# Patient Record
Sex: MALE | Race: WHITE | NOT HISPANIC OR LATINO | Employment: UNEMPLOYED | URBAN - METROPOLITAN AREA
[De-identification: names, ages, dates, MRNs, and addresses within clinical notes are randomized per-mention and may not be internally consistent; named-entity substitution may affect disease eponyms.]

---

## 2020-02-28 ENCOUNTER — HOSPITAL ENCOUNTER (EMERGENCY)
Age: 70
Discharge: HOME | End: 2020-02-29
Attending: EMERGENCY MEDICINE

## 2020-02-28 ENCOUNTER — APPOINTMENT (OUTPATIENT)
Dept: CT IMAGING | Age: 70
End: 2020-02-28
Attending: INTERNAL MEDICINE

## 2020-02-28 DIAGNOSIS — F10.10 ALCOHOL ABUSE: ICD-10-CM

## 2020-02-28 DIAGNOSIS — T14.8XXA HEMATOMA: ICD-10-CM
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
## 2020-02-28 LAB
ALBUMIN SERPL-MCNC: 3.8 G/DL (ref 3.6–5.1)
ALBUMIN/GLOB SERPL: 0.9 {RATIO} (ref 1–2.4)
ALP SERPL-CCNC: 127 UNITS/L (ref 45–117)
ALT SERPL-CCNC: 32 UNITS/L
ANION GAP SERPL CALC-SCNC: 7 MMOL/L (ref 10–20)
AST SERPL-CCNC: 21 UNITS/L
BASOPHILS # BLD: 0.1 K/MCL (ref 0–0.3)
BASOPHILS NFR BLD: 1 %
BILIRUB SERPL-MCNC: 0.3 MG/DL (ref 0.2–1)
BUN SERPL-MCNC: 11 MG/DL (ref 6–20)
BUN/CREAT SERPL: 11 (ref 7–25)
CALCIUM SERPL-MCNC: 8.7 MG/DL (ref 8.4–10.2)
CHLORIDE SERPL-SCNC: 110 MMOL/L (ref 98–107)
CO2 SERPL-SCNC: 30 MMOL/L (ref 21–32)
CREAT SERPL-MCNC: 0.96 MG/DL (ref 0.67–1.17)
DIFFERENTIAL METHOD BLD: ABNORMAL
EOSINOPHIL # BLD: 0.5 K/MCL (ref 0.1–0.5)
EOSINOPHIL NFR BLD: 5 %
ERYTHROCYTE [DISTWIDTH] IN BLOOD: 14.6 % (ref 11–15)
ETHANOL SERPL-MCNC: 344 MG/DL
GLOBULIN SER-MCNC: 4.1 G/DL (ref 2–4)
GLUCOSE SERPL-MCNC: 121 MG/DL (ref 65–99)
HCT VFR BLD CALC: 50.1 % (ref 39–51)
HGB BLD-MCNC: 16.4 G/DL (ref 13–17)
IMM GRANULOCYTES # BLD AUTO: 0 K/MCL (ref 0–0.2)
IMM GRANULOCYTES NFR BLD: 0 %
LYMPHOCYTES # BLD: 4.2 K/MCL (ref 1–4)
LYMPHOCYTES NFR BLD: 36 %
MAGNESIUM SERPL-MCNC: 2.5 MG/DL (ref 1.7–2.4)
MCH RBC QN AUTO: 31.4 PG (ref 26–34)
MCHC RBC AUTO-ENTMCNC: 32.7 G/DL (ref 32–36.5)
MCV RBC AUTO: 96 FL (ref 78–100)
MONOCYTES # BLD: 0.9 K/MCL (ref 0.3–0.9)
MONOCYTES NFR BLD: 7 %
NEUTROPHILS # BLD: 6.1 K/MCL (ref 1.8–7.7)
NEUTROPHILS NFR BLD: 51 %
NRBC BLD MANUAL-RTO: 0 /100 WBC
PLATELET # BLD: 323 K/MCL (ref 140–450)
POTASSIUM SERPL-SCNC: 3.9 MMOL/L (ref 3.4–5.1)
PROT SERPL-MCNC: 7.9 G/DL (ref 6.4–8.2)
RBC # BLD: 5.22 MIL/MCL (ref 4.5–5.9)
SODIUM SERPL-SCNC: 143 MMOL/L (ref 135–145)
WBC # BLD: 11.8 K/MCL (ref 4.2–11)

## 2020-02-28 PROCEDURE — 36415 COLL VENOUS BLD VENIPUNCTURE: CPT

## 2020-02-28 PROCEDURE — 70450 CT HEAD/BRAIN W/O DYE: CPT

## 2020-02-28 PROCEDURE — 72125 CT NECK SPINE W/O DYE: CPT

## 2020-02-28 PROCEDURE — 83735 ASSAY OF MAGNESIUM: CPT

## 2020-02-28 PROCEDURE — 99285 EMERGENCY DEPT VISIT HI MDM: CPT | Performed by: EMERGENCY MEDICINE

## 2020-02-28 PROCEDURE — 80053 COMPREHEN METABOLIC PANEL: CPT

## 2020-02-28 PROCEDURE — 85025 COMPLETE CBC W/AUTO DIFF WBC: CPT

## 2020-02-28 PROCEDURE — 70486 CT MAXILLOFACIAL W/O DYE: CPT

## 2020-02-28 PROCEDURE — 80320 DRUG SCREEN QUANTALCOHOLS: CPT

## 2020-02-28 PROCEDURE — 70480 CT ORBIT/EAR/FOSSA W/O DYE: CPT

## 2020-02-28 PROCEDURE — 99284 EMERGENCY DEPT VISIT MOD MDM: CPT

## 2020-02-28 ASSESSMENT — PAIN SCALES - GENERAL: PAINLEVEL_OUTOF10: 0

## 2020-02-29 VITALS
HEART RATE: 100 BPM | SYSTOLIC BLOOD PRESSURE: 146 MMHG | TEMPERATURE: 97.2 F | DIASTOLIC BLOOD PRESSURE: 98 MMHG | RESPIRATION RATE: 17 BRPM | OXYGEN SATURATION: 96 %

## 2020-02-29 PROCEDURE — 10002803 HB RX 637: Performed by: EMERGENCY MEDICINE

## 2020-02-29 RX ORDER — NICOTINE 21 MG/24HR
1 PATCH, TRANSDERMAL 24 HOURS TRANSDERMAL ONCE
Status: DISCONTINUED | OUTPATIENT
Start: 2020-02-29 | End: 2020-02-29 | Stop reason: HOSPADM

## 2020-02-29 RX ADMIN — NICOTINE 1 PATCH: 21 PATCH TRANSDERMAL at 02:50

## 2023-06-21 PROBLEM — F17.200 SMOKER: Status: ACTIVE | Noted: 2018-08-28

## 2023-06-21 PROBLEM — R53.83 FATIGUE: Status: ACTIVE | Noted: 2017-06-23

## 2023-06-21 PROBLEM — Z12.5 SCREENING FOR PROSTATE CANCER: Status: ACTIVE | Noted: 2017-06-23

## 2023-06-21 PROBLEM — E66.3 OVERWEIGHT WITH BODY MASS INDEX (BMI) 25.0-29.9: Status: ACTIVE | Noted: 2018-08-28

## 2023-09-13 PROBLEM — F17.210 DEPENDENCE ON NICOTINE FROM CIGARETTES: Status: ACTIVE | Noted: 2023-09-13

## 2023-09-13 PROBLEM — R05.3 CHRONIC COUGH: Status: ACTIVE | Noted: 2023-09-13

## 2023-09-13 PROBLEM — E78.2 MIXED HYPERLIPIDEMIA: Status: ACTIVE | Noted: 2023-09-13

## 2023-09-13 PROBLEM — I10 PRIMARY HYPERTENSION: Status: ACTIVE | Noted: 2023-09-13

## 2024-11-12 ENCOUNTER — OFFICE VISIT (OUTPATIENT)
Dept: INTERNAL MEDICINE CLINIC | Facility: CLINIC | Age: 74
End: 2024-11-12
Payer: MEDICARE

## 2024-11-12 VITALS
OXYGEN SATURATION: 97 % | WEIGHT: 189 LBS | SYSTOLIC BLOOD PRESSURE: 140 MMHG | HEIGHT: 67.75 IN | HEART RATE: 94 BPM | BODY MASS INDEX: 28.98 KG/M2 | DIASTOLIC BLOOD PRESSURE: 86 MMHG | TEMPERATURE: 98 F

## 2024-11-12 DIAGNOSIS — Z00.00 HEALTH MAINTENANCE EXAMINATION: Primary | ICD-10-CM

## 2024-11-12 DIAGNOSIS — Z12.11 COLON CANCER SCREENING: ICD-10-CM

## 2024-11-12 DIAGNOSIS — F17.210 CIGARETTE SMOKER: ICD-10-CM

## 2024-11-12 DIAGNOSIS — E78.5 HYPERLIPIDEMIA, UNSPECIFIED HYPERLIPIDEMIA TYPE: ICD-10-CM

## 2024-11-12 DIAGNOSIS — E66.3 OVERWEIGHT (BMI 25.0-29.9): ICD-10-CM

## 2024-11-12 DIAGNOSIS — Z86.69 HISTORY OF CATARACT: ICD-10-CM

## 2024-11-12 DIAGNOSIS — I10 PRIMARY HYPERTENSION: ICD-10-CM

## 2024-11-12 PROCEDURE — 1160F RVW MEDS BY RX/DR IN RCRD: CPT | Performed by: FAMILY MEDICINE

## 2024-11-12 PROCEDURE — 1159F MED LIST DOCD IN RCRD: CPT | Performed by: FAMILY MEDICINE

## 2024-11-12 PROCEDURE — 1170F FXNL STATUS ASSESSED: CPT | Performed by: FAMILY MEDICINE

## 2024-11-12 PROCEDURE — 96160 PT-FOCUSED HLTH RISK ASSMT: CPT | Performed by: FAMILY MEDICINE

## 2024-11-12 PROCEDURE — G0438 PPPS, INITIAL VISIT: HCPCS | Performed by: FAMILY MEDICINE

## 2024-11-12 PROCEDURE — 99406 BEHAV CHNG SMOKING 3-10 MIN: CPT | Performed by: FAMILY MEDICINE

## 2024-11-12 RX ORDER — METOPROLOL SUCCINATE 25 MG/1
25 TABLET, EXTENDED RELEASE ORAL DAILY
COMMUNITY
End: 2024-11-13

## 2024-11-12 RX ORDER — LISINOPRIL 40 MG/1
40 TABLET ORAL DAILY
Qty: 90 TABLET | Refills: 1 | Status: SHIPPED | OUTPATIENT
Start: 2024-11-12

## 2024-11-12 RX ORDER — SIMVASTATIN 40 MG
40 TABLET ORAL NIGHTLY
COMMUNITY

## 2024-11-12 RX ORDER — LISINOPRIL 10 MG/1
10 TABLET ORAL DAILY
COMMUNITY
End: 2024-11-12

## 2024-11-12 NOTE — PROGRESS NOTES
FAMILY MEDICINE CLINIC NOTE - MEDICARE    HPI  Aram Mcmullen is a 74 year old male presenting for a MA AHA (Medicare Advantage Annual Health Assessment) and IPPE (Initial Preventative Physical Exam) (Welcome to Medicare- < 12 months on Medicare).    #Health Maintenance  -Diet: Tries to eat healthy sometimes  -Exercise: Xsport - everyday. Goes on the bike  -Lung cancer screen: Indicated  -Colon cancer screen: Indicated - does not want colonoscopy  -Prostate cancer screen: 6/2023 PSA negative  -Aortic aneurysm screen: Indicated  -Statin:  Will check lipid panel  -ASA: Not indicated  -HIV screen: Not indicated  -Hep C screen: Indicated  -Gonorrhea/chlamydia:  Not indicated  -Syphillis: Not indicated  -TB: Not indicated  -Tobacco/alcohol: Per below    #Immunizations  -Tdap:  Medicare  -Flu shot:  8/2024  -PCV13:  5/2016    -PCV20:  1/2023    -PPSV23: Not indicated   -RZV (preferred) or VZL: Indicated   -RSV: Indicated   -COVID: Indicated    #HTN  -lisinopril 10 mg daily  -metoprolol succinate 25 mg daily   -no history of heart disease, migraines  -not sure why on metoprolol    #HLD  -simvastatin 40 mg. Takes 20 mg 2-3 times a week    #Cigarette smoker  -still smoking     #History of cataracts  -has had history of cataract extraction 2012      #Additional screenings  History/Other:   Fall Risk Assessment:   He has been screened for Falls and is low risk.      Cognitive Assessment:   He had a completely normal cognitive assessment - see flowsheet entries       Functional Ability/Status:   Aram Mcmullen has a completely normal functional assessment. See flowsheet for details.      Depression Screening (PHQ-2/PHQ-9): PHQ-2 SCORE: 0  , done 11/12/2024        Advanced Directives:   He does NOT have a Living Will. [Do you have a living will?: No]  He does NOT have a Power of  for Health Care. [Do you have a healthcare power of ?: No]  Discussed Advance Care Planning with patient (and family/surrogate if present).  Standard forms made available to patient in After Visit Summary.    #Patient Care Team  No care team member to display      ROS  GENERAL: No fever/chills, no recent weight loss   HEENT: No visual changes, no changes in hearing, no sore throats  NECK: No pain, no swelling  RESP: No cough, no SOB  CV: No chest pain, no palpitations  GI: No abd pain, no N/V/D  MSK: No edema, no pain  SKIN: No new rashes  NEURO: No numbness, no tingling, no HA    HEALTH MAINTENANCE CHECKLIST  Health Maintenance Topics with due status: Overdue       Topic Date Due    Colorectal Cancer Screening Never done    PSA Never done    Zoster Vaccines Never done    Lung Cancer Screening Never done    MA Annual Health Assessment Never done    Annual Depression Screening Never done    Fall Risk Screening (Annual) Never done    Tobacco Cessation Counseling Never done    COVID-19 Vaccine Never done       ALLERGIES  Allergies[1]    MEDICATIONS  Current Outpatient Medications   Medication Sig Dispense Refill    simvastatin 40 MG Oral Tab Take 1 tablet (40 mg total) by mouth nightly.      metoprolol succinate ER 25 MG Oral Tablet 24 Hr Take 1 tablet (25 mg total) by mouth daily.      lisinopril 40 MG Oral Tab Take 1 tablet (40 mg total) by mouth daily. 90 tablet 1       ACTIVE PROBLEM  Patient Active Problem List   Diagnosis    Hypertension    Hyperlipidemia    Cigarette smoker    Health maintenance examination    History of cataract    Overweight (BMI 25.0-29.9)       PAST MEDICAL HISTORY  Past Medical History:    Essential hypertension       PAST SOCIAL HISTORY  Social History     Socioeconomic History    Marital status: Legally      Spouse name: Not on file    Number of children: Not on file    Years of education: Not on file    Highest education level: Not on file   Occupational History    Not on file   Tobacco Use    Smoking status: Every Day     Current packs/day: 0.50     Average packs/day: 0.5 packs/day for 56.9 years (28.4 ttl pk-yrs)      Types: Cigarettes     Start date: 1968    Smokeless tobacco: Never   Vaping Use    Vaping status: Never Used   Substance and Sexual Activity    Alcohol use: Yes     Comment: Occasional - a couple times a week    Drug use: Never    Sexual activity: Not Currently     Partners: Female   Other Topics Concern    Not on file   Social History Narrative    Relationships:     Children: None    Pets: None    School: N/A    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     Origin: From [place], came to  1986    Interests: Not much    Spiritual: Taoist      Social Drivers of Health     Financial Resource Strain: Not on file   Food Insecurity: Not on file   Transportation Needs: Not on file   Physical Activity: Not on file   Stress: Not on file   Social Connections: Not on file   Housing Stability: Not on file       CAGE Alcohol Screen:   CAGE screening score of 0 on 11/12/2024, showing low risk of alcohol abuse.          PAST SURGICAL HISTORY  Past Surgical History:   Procedure Laterality Date    Cataract Bilateral 2012       PAST FAMILY HISTORY  Family History   Problem Relation Age of Onset    No Known Problems Mother     No Known Problems Father     Stroke Sister     Hypertension Brother     No Known Problems Maternal Grandmother     No Known Problems Maternal Grandfather     No Known Problems Paternal Grandmother     No Known Problems Paternal Grandfather     Colon Cancer Neg     Prostate Cancer Neg        PHYSICAL EXAM  Vitals:    11/12/24 0928   BP: 140/86   Pulse: 94   Temp: 98.4 °F (36.9 °C)   SpO2: 97%   Weight: 189 lb (85.7 kg)   Height: 5' 7.75\" (1.721 m)      Body mass index is 28.95 kg/m².    Medicare Hearing Assessment:  Hearing Screening    Time taken: 11/12/2024 10:36 AM  Entry User: Teagan Wang CMA  Screening Method: Questionnaire  I have a problem hearing over the telephone: No I have trouble following the conversations when two or more people are talking at the same time: No   I have trouble understanding  things on the TV: No I have to strain to understand conversations: No   I have to worry about missing the telephone ring or doorbell: No I have trouble hearing conversations in a noisy background such as a crowded room or restaurant: No   I get confused about where sounds come from: No I misunderstand some words in a sentence and need to ask people to repeat themselves: No   I especially have trouble understanding the speech of women and children: No I have trouble understanding the speaker in a large room such as at a meeting or place of Nondenominational: No   Many people I talk to seem to mumble (or don't speak clearly): No People get annoyed because I misunderstand what they say: No   I misunderstand what others are saying and make inappropriate responses: No I avoid social activities because I cannot hear well and fear I will reply improperly: No   Family members and friends have told me they think I may have hearing loss: No           Visual Acuity:   Visual Acuity:   Right Eye Visual Acuity: Uncorrected Right Eye Chart Acuity: 20/40   Left Eye Visual Acuity: Uncorrected Left Eye Chart Acuity: 20/40   Both Eyes Visual Acuity: Uncorrected Both Eyes Chart Acuity: 20/40   Able To Tolerate Visual Acuity: Yes        GENERAL: NAD  HEENT: Moist mucous membranes, no tonsillar swelling or erythema, PERRLA bilat, TM translucent and non-bulging  NECK: Supple, non-tender  RESP: CTAB, no wheezing, no rales, no rhonchi  CV: RRR, no murmurs  GI: Soft, non-distended, non-tender, no guarding, no rebound, no masses  MSK: No edema  SKIN: Warm and dry, no rashes  NEURO: Answering questions appropriately    LABS    No results found for: \"WBC\", \"HGB\", \"PLT\"    No results found for: \"AST\", \"ALT\", \"CA\", \"ALB\", \"TSH\", \"CREATSERUM\", \"GLU\"    No results found for: \"CHOLEST\", \"HDL\", \"LDL\", \"TRIG\"      DIAGNOSTICS    ASSESSMENT/PLAN  Problem List Items Addressed This Visit          Cardiac and Vasculature    Hyperlipidemia     Start taking  simvastatin 40 mg nightly on a consistent basis.  Monitor CMP and lipid panel today.         Relevant Medications    simvastatin 40 MG Oral Tab    Other Relevant Orders    Comp Metabolic Panel (14)    Lipid Panel    Hypertension     Blood pressure elevated with multiple reads in office.  Will adjust medication  Increase lisinopril to 40 mg daily  Discontinue metoprolol for now.  Monitor blood pressures at home  Bring in blood pressure machine and log to next office visit.  Monitor CMP.         Relevant Medications    metoprolol succinate ER 25 MG Oral Tablet 24 Hr    lisinopril 40 MG Oral Tab    Other Relevant Orders    Comp Metabolic Panel (14)       Endocrine and Metabolic    Overweight (BMI 25.0-29.9)     Diet and exercise advised.  Check labs.         Relevant Medications    simvastatin 40 MG Oral Tab       Eye    History of cataract     Referral to ophthalmologist provided         Relevant Orders    OPHTHALMOLOGY - EXTERNAL       Tobacco    Cigarette smoker     Cigarette smoking cessation advised.  He is not quite ready to quit at this time  CT lung screen  AAA ultrasound         Relevant Orders    CT LUNG LD SCREENING(CPT=71271)    US ABDOMINAL AORTIC ANEURYSM SCREENING (CPT=76706)    Tobacco Use Counseling 3-10 Min [85078]       Health Encounters    Health maintenance examination - Primary     Exercise and diet advised.  CBC, CMP, lipid panel, hepatitis C screen  Shingles vaccine advised  COVID vaccine advised.  Advanced directive information provided.  Cologuard ordered  CT lung screen ordered.  AAA screen ordered.         Relevant Orders    US ABDOMINAL AORTIC ANEURYSM SCREENING (CPT=76706)    CBC With Differential With Platelet    Comp Metabolic Panel (14)    HCV Antibody    Lipid Panel     Other Visit Diagnoses       Colon cancer screening        Relevant Orders    COLOGUARD COLON CANCER SCREENING (EXTERNAL)            Return in about 2 weeks (around 11/26/2024) for follow up.    Frederick Cornejo  MD  Family Medicine    11/12/2024         Supplementary Documentation:   General Health:  In the past six months, have you lost more than 10 pounds without trying?: 2 - No  Has your appetite been poor?: No  Type of Diet: Balanced  How does the patient maintain a good energy level?: Appropriate Exercise  How would you describe your daily physical activity?: Moderate  How would you describe your current health state?: Fair  How do you maintain positive mental well-being?: Social Interaction  Have you had any immunizations at another office such as Influenza, Hepatitis B, Tetanus, or Pneumococcal?: Stefanie Mcmullen's SCREENING SCHEDULE   Tests on this list are recommended by your physician but may not be covered, or covered at this frequency, by your insurer.   Please check with your insurance carrier before scheduling to verify coverage.   PREVENTATIVE SERVICES FREQUENCY &  COVERAGE DETAILS LAST COMPLETION DATE   Diabetes Screening    Fasting Blood Sugar / Glucose    One screening every 12 months if never tested or if previously tested but not diagnosed with pre-diabetes   One screening every 6 months if diagnosed with pre-diabetes No results found for: \"GLUCOSE\", \"GLU\"     Cardiovascular Disease Screening    Lipid Panel  Cholesterol  Lipoprotein (HDL)  Triglycerides Covered every 5 years for all Medicare beneficiaries without apparent signs or symptoms of cardiovascular disease No results found for: \"CHOLEST\", \"HDL\", \"LDL\", \"LDLD\", \"LDLC\", \"TRIG\"      Electrocardiogram (EKG)   Covered if needed at Welcome to Medicare, and non-screening if indicated for medical reasons -      Ultrasound Screening for Abdominal Aortic Aneurysm (AAA) Covered once in a lifetime for one of the following risk factors    Men who are 65-75 years old and have ever smoked    Anyone with a family history -     Colorectal Cancer Screening  Covered for ages 50-85; only need ONE of the following:    Colonoscopy   Covered every 10  years    Covered every 2 years if patient is at high risk or previous colonoscopy was abnormal -    Health Maintenance   Topic Date Due    Colorectal Cancer Screening  Never done       Flexible Sigmoidoscopy   Covered every 4 years -    Fecal Occult Blood Test Covered annually -   Prostate Cancer Screening    Prostate-Specific Antigen (PSA) Annually No results found for: \"PSA\"  Health Maintenance   Topic Date Due    PSA  Never done      Immunizations    Influenza Covered once per flu season  Please get every year 08/03/2024  No recommendations at this time    Pneumococcal Each vaccine ([id number] & [id number]) covered once after 65 Prevnar 13: 05/28/2016    [id number]: -     No recommendations at this time    Hepatitis B One screening covered for patients with certain risk factors   -  No recommendations at this time    Tetanus Toxoid Not covered by Medicare Part B unless medically necessary (cut with metal); may be covered with your pharmacy prescription benefits -    Tetanus, Diptheria and Pertusis TD and TDaP Not covered by Medicare Part B -  No recommendations at this time    Zoster Not covered by Medicare Part B; may be covered with your pharmacy  prescription benefits -  Zoster Vaccines(1 of 2) Never done     Annual Monitoring of Persistent Medications (ACE/ARB, digoxin diuretics, anticonvulsants)    Potassium Annually No results found for: \"K\", \"POTASSIUM\"      Creatinine   Annually No results found for: \"CREATININE\", \"CREATSERUM\"      BUN Annually No results found for: \"BUN\"    Drug Serum Conc Annually No results found for: \"DIGOXIN\", \"DIG\", \"VALP\"            Tobacco cessation counseling for <3 minutes.         [1] No Known Allergies

## 2024-11-12 NOTE — ASSESSMENT & PLAN NOTE
Start taking simvastatin 40 mg nightly on a consistent basis.  Monitor CMP and lipid panel today.

## 2024-11-12 NOTE — ASSESSMENT & PLAN NOTE
Cigarette smoking cessation advised.  He is not quite ready to quit at this time  CT lung screen  AAA ultrasound

## 2024-11-12 NOTE — PATIENT INSTRUCTIONS
PATIENT INSTRUCTIONS    Thank you for seeing me today, it was a pleasure taking care of you.  Please check out at the  and schedule a follow up appointment.  Return in about 2 weeks (around 11/26/2024) for follow up.  Please remember that the preferred kailyn period for appointments is 5 minutes. This is to help maximize the amount of time that we can spend together at our visits.    Please get your labs drawn - you may need to schedule a lab appointment if this was not completed at your recent doctor's visit.  The following imaging studies were ordered: CT lung, AAA ultrasound  Please call [phone] to schedule your imaging appointment.   Please also follow up with the following specialists: Ophthalmology   Provider Address Phone   Pankaj Patel MD [address] [facility] [address] [phone]      Please fill out the advance directive information (power of  documents) and bring a copy to our clinic.  Cologuard stool test - will be mailed to your home. Please follow the instructions and provide a stool sample  Simvastatin 40 mg - take the whole thing, everyday   Stop metoprolol  Increase lisinopril to 40 mg daily  Monitor your blood pressures at home  Bring in your blood pressure machine and log when I see you  Consider shingles vaccine at pharmacy  Stop smoking        Best,   Dr. Cornejo      Quitting Smoking    Quitting smoking is the most important step you can take to improve your health. We're glad you have set a goal to improve your health.    Quit Smoking Resources    In addition to medications, use the STAR plan to help you successfully quit.   Stick with your quit date!   Tell friends, family, and coworkers your quit date. Request their understanding and support.  Anticipate and prepare for challenges. Some examples are withdrawal symptoms, being around others who smoke, and drinking alcohol.  Remove all tobacco products and paraphernalia from your environment. Make your home  and vehicles smoke-free.    Free resources for additional support:  National tobacco quitline: 1-800-QUIT-NOW (1-599.199.9813).  SmokefreeTXT is a free text program to assist you in quitting. Visit https://www.smokefree.gov/smokefreetxt for more information.  Feel free to call your care manager at ([phone]) for additional support.

## 2024-11-12 NOTE — ASSESSMENT & PLAN NOTE
Blood pressure elevated with multiple reads in office.  Will adjust medication  Increase lisinopril to 40 mg daily  Discontinue metoprolol for now.  Monitor blood pressures at home  Bring in blood pressure machine and log to next office visit.  Monitor CMP.

## 2024-11-12 NOTE — ASSESSMENT & PLAN NOTE
Exercise and diet advised.  CBC, CMP, lipid panel, hepatitis C screen  Shingles vaccine advised  COVID vaccine advised.  Advanced directive information provided.  Cologuard ordered  CT lung screen ordered.  AAA screen ordered.

## 2024-11-13 ENCOUNTER — TELEPHONE (OUTPATIENT)
Dept: INTERNAL MEDICINE CLINIC | Facility: CLINIC | Age: 74
End: 2024-11-13

## 2024-11-13 LAB
ABSOLUTE BASOPHILS: 78 CELLS/UL (ref 0–200)
ABSOLUTE EOSINOPHILS: 366 CELLS/UL (ref 15–500)
ABSOLUTE LYMPHOCYTES: 2409 CELLS/UL (ref 850–3900)
ABSOLUTE MONOCYTES: 1032 CELLS/UL (ref 200–950)
ABSOLUTE NEUTROPHILS: 7215 CELLS/UL (ref 1500–7800)
ALBUMIN/GLOBULIN RATIO: 1.6 (CALC) (ref 1–2.5)
ALBUMIN: 4.2 G/DL (ref 3.6–5.1)
ALKALINE PHOSPHATASE: 102 U/L (ref 35–144)
ALT: 31 U/L (ref 9–46)
AST: 24 U/L (ref 10–35)
BASOPHILS: 0.7 %
BILIRUBIN, TOTAL: 0.5 MG/DL (ref 0.2–1.2)
BUN: 16 MG/DL (ref 7–25)
CALCIUM: 9.6 MG/DL (ref 8.6–10.3)
CARBON DIOXIDE: 27 MMOL/L (ref 20–32)
CHLORIDE: 105 MMOL/L (ref 98–110)
CHOL/HDLC RATIO: 5.1 (CALC)
CHOLESTEROL, TOTAL: 185 MG/DL
CREATININE: 0.98 MG/DL (ref 0.7–1.28)
EGFR: 81 ML/MIN/1.73M2
EOSINOPHILS: 3.3 %
GLOBULIN: 2.6 G/DL (CALC) (ref 1.9–3.7)
GLUCOSE: 103 MG/DL (ref 65–99)
HDL CHOLESTEROL: 36 MG/DL
HEMATOCRIT: 49.5 % (ref 38.5–50)
HEMOGLOBIN: 16.4 G/DL (ref 13.2–17.1)
LDL-CHOLESTEROL: 104 MG/DL (CALC)
LYMPHOCYTES: 21.7 %
MCH: 31.4 PG (ref 27–33)
MCHC: 33.1 G/DL (ref 32–36)
MCV: 94.6 FL (ref 80–100)
MONOCYTES: 9.3 %
MPV: 9.7 FL (ref 7.5–12.5)
NEUTROPHILS: 65 %
NON-HDL CHOLESTEROL: 149 MG/DL (CALC)
PLATELET COUNT: 273 THOUSAND/UL (ref 140–400)
POTASSIUM: 4.9 MMOL/L (ref 3.5–5.3)
PROTEIN, TOTAL: 6.8 G/DL (ref 6.1–8.1)
RDW: 13 % (ref 11–15)
RED BLOOD CELL COUNT: 5.23 MILLION/UL (ref 4.2–5.8)
SODIUM: 141 MMOL/L (ref 135–146)
TRIGLYCERIDES: 400 MG/DL
WHITE BLOOD CELL COUNT: 11.1 THOUSAND/UL (ref 3.8–10.8)

## 2024-11-13 NOTE — TELEPHONE ENCOUNTER
[facility] called the office.  Verifying that patient is to take 40 mg of Losartan.  As it is a significant increase from the 10 mg that patient was taking.

## 2024-11-13 NOTE — TELEPHONE ENCOUNTER
Spoke with [facility]  pharmacy. Pharmacist states that lisinopril 10 mg was ordered by Dr. Easley. Patient just picked up lisinopril 10 mg on 10/20/24. She want to make sure that you want 40 mg as per yesterday order since it is an increase of 4 times.

## 2024-11-14 NOTE — TELEPHONE ENCOUNTER
Spoke with Ainsley at [facility]. Dr. Cornejo has increase lisinopril to 40 mg and discontinued metoprolol.

## 2024-12-02 NOTE — PROGRESS NOTES
FAMILY MEDICINE CLINIC NOTE    HPI  Aram Mcmullen is a 74 year old male presenting for       #HTN  -lisinopril 40 mg daily  -metoprolol succinate 25 mg daily   -reports blood pressures are very good at home now - in 120s systolic    #Tremor  -bilateral hands  -ongoing for a few years   -not significantly bothersome     #HLD  -simvastatin 40 mg - not taking consistently    #Cigarette smoker  -still smoking   -CT lung screen ordered  -[facility] ultrasound ordered     #History of cataracts  -has had history of cataract extraction 2012  -referred to new opthalmologic       ROS  GENERAL: No fever/chills, no recent weight loss  HEENT: No visual changes, no changes in hearing, no sore throats  NECK: No pain, no swelling  RESP: No cough, no SOB  CV: No chest pain, no palpitations  GI: No abd pain, no N/V/D  MSK: No edema  SKIN: No new rashes  NEURO: No numbness, no tingling, no headaches    HEALTH MAINTENANCE  Health Maintenance Topics with due status: Overdue       Topic Date Due    Colorectal Cancer Screening Never done    PSA Never done    Zoster Vaccines Never done    Lung Cancer Screening Never done    COVID-19 Vaccine Never done     Health Maintenance Topics with due status: Due Soon       Topic Date Due    HTN: BP Follow-Up 12/12/2024       ALLERGIES  Allergies[1]    MEDICATIONS  Current Outpatient Medications   Medication Sig Dispense Refill    simvastatin 40 MG Oral Tab Take 1 tablet (40 mg total) by mouth nightly. 90 tablet 3    metoprolol succinate ER 25 MG Oral Tablet 24 Hr Take 1 tablet (25 mg total) by mouth daily. 90 tablet 3    lisinopril 40 MG Oral Tab Take 1 tablet (40 mg total) by mouth daily. 90 tablet 1       ACTIVE PROBLEMS  Patient Active Problem List   Diagnosis    Hypertension    Hyperlipidemia    Cigarette smoker    Health maintenance examination    History of cataract    Overweight (BMI 25.0-29.9)    Tremor       PAST MEDICAL HISTORY  Past Medical History:    Essential hypertension       PAST SOCIAL  HISTORY  Social History     Socioeconomic History    Marital status: Legally      Spouse name: Not on file    Number of children: Not on file    Years of education: Not on file    Highest education level: Not on file   Occupational History    Not on file   Tobacco Use    Smoking status: Every Day     Current packs/day: 0.50     Average packs/day: 0.5 packs/day for 56.9 years (28.5 ttl pk-yrs)     Types: Cigarettes     Start date: 1968    Smokeless tobacco: Never   Vaping Use    Vaping status: Never Used   Substance and Sexual Activity    Alcohol use: Yes     Comment: Occasional - a couple times a week    Drug use: Never    Sexual activity: Not Currently     Partners: Female   Other Topics Concern    Not on file   Social History Narrative    Relationships:     Children: None    Pets: None    School: N/A    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     Origin: From [place], came to  1986    Interests: Not much    Spiritual: Amish      Social Drivers of Health     Financial Resource Strain: Not on file   Food Insecurity: Not on file   Transportation Needs: Not on file   Physical Activity: Not on file   Stress: Not on file   Social Connections: Not on file   Housing Stability: Not on file       PAST SURGICAL HISTORY  Past Surgical History:   Procedure Laterality Date    Cataract Bilateral 2012       PAST FAMILY HISTORY  Family History   Problem Relation Age of Onset    No Known Problems Mother     No Known Problems Father     Stroke Sister     Hypertension Brother     No Known Problems Maternal Grandmother     No Known Problems Maternal Grandfather     No Known Problems Paternal Grandmother     No Known Problems Paternal Grandfather     Colon Cancer Neg     Prostate Cancer Neg          PHYSICAL EXAM  Vitals:    12/03/24 0928 12/03/24 0949   BP: (!) 140/92 120/84   Pulse: 78    Temp: 97.1 °F (36.2 °C)    SpO2: 98%    Weight: 189 lb (85.7 kg)       Body mass index is 28.95 kg/m².    GENERAL: NAD, smells of  smoke  RESP: Non-labored respirations, CTAB, no wheezing, no rales, no rhonchi  CV: RRR, no murmurs  MSK: No edema  SKIN: Warm and dry, no rashes  NEURO: Answering questions appropriately. Resting tremor present. Mild cogwheel rigidity bilateral upper extremities. Stable gait.   CN II: Peripheral vision intact. PERRLA.  CN III, IV, VI: Extraocular movements are intact.  CN V: Facial sensation is intact to light touch bilaterally.   CN VII: Face is symmetric with normal eye closure and smile.  CN VIII: Hearing is normal to rubbing fingers  CN IX, X: Palate elevates symmetrically. Phonation is normal.  CN XI: Head turning and shoulder shrug are intact  CN XII: Tongue is midline with normal movements and no atrophy.      LABS  Lab Results   Component Value Date    WBC 11.1 (H) 11/12/2024    HGB 16.4 11/12/2024    HCT 49.5 11/12/2024     11/12/2024    NEPERCENT 65 11/12/2024    LYPERCENT 21.7 11/12/2024    MOPERCENT 9.3 11/12/2024    EOPERCENT 3.3 11/12/2024    BAPERCENT 0.7 11/12/2024    NE 7,215 11/12/2024    LYMABS 2,409 11/12/2024    MOABSO 1,032 (H) 11/12/2024    EOABSO 366 11/12/2024    BAABSO 78 11/12/2024       Lab Results   Component Value Date     11/12/2024    K 4.9 11/12/2024     11/12/2024    CO2 27 11/12/2024    BUN 16 11/12/2024    CREATSERUM 0.98 11/12/2024    BUNCREA SEE NOTE: 11/12/2024     (H) 11/12/2024    CA 9.6 11/12/2024    ALT 31 11/12/2024    AST 24 11/12/2024    ALKPHO 102 11/12/2024    BILT 0.5 11/12/2024    TP 6.8 11/12/2024    ALB 4.2 11/12/2024    GLOBULIN 2.6 11/12/2024         Lab Results   Component Value Date    CHOLEST 185 11/12/2024    TRIG 400 (H) 11/12/2024    HDL 36 (L) 11/12/2024     (H) 11/12/2024    TCHDLRATIO 5.1 (H) 11/12/2024    NONHDLC 149 (H) 11/12/2024        DIAGNOSTICS      ASSESSMENT/PLAN  Problem List Items Addressed This Visit          Cardiac and Vasculature    Hyperlipidemia - Primary     Start taking simvastatin 40 mg nightly on a  consistent basis.         Relevant Medications    simvastatin 40 MG Oral Tab    Hypertension     Blood pressures improved  Continue lisinopril to 40 mg daily  Continue metoprolol succinate ER 25 mg daily         Relevant Medications    metoprolol succinate ER 25 MG Oral Tablet 24 Hr       Neuro    Tremor     Patient with a resting tremor as well as subtle cogwheel rigidity seen on examination.  Recommend establishing care with a neurologist.         Relevant Medications    metoprolol succinate ER 25 MG Oral Tablet 24 Hr    Other Relevant Orders    NEURO - INTERNAL       Eye    History of cataract     Referral to ophthalmologist provided            Tobacco    Cigarette smoker     Cigarette smoking cessation advised.  He is not quite ready to quit at this time  CT lung screen  [facility] ultrasound            Return in about 6 months (around 6/3/2025) for medicare visit.    No topic due editable text     Frederick Cornejo MD  Family Medicine           Pre-chartin minutes  Reviewing/obtaining: 10 minutes  Medical Exam:1 minutes  Counseling/education: 5 minutes  Notes: 5 minutes  Referring/communicatin minutes  Care coordination: 0 minutes    My total time spent caring for the patient on the day of the encounter: 23 minutes         [1] No Known Allergies

## 2024-12-03 ENCOUNTER — OFFICE VISIT (OUTPATIENT)
Dept: INTERNAL MEDICINE CLINIC | Facility: CLINIC | Age: 74
End: 2024-12-03
Payer: MEDICARE

## 2024-12-03 VITALS
BODY MASS INDEX: 29 KG/M2 | OXYGEN SATURATION: 98 % | TEMPERATURE: 97 F | DIASTOLIC BLOOD PRESSURE: 84 MMHG | SYSTOLIC BLOOD PRESSURE: 120 MMHG | WEIGHT: 189 LBS | HEART RATE: 78 BPM

## 2024-12-03 DIAGNOSIS — R25.1 TREMOR: ICD-10-CM

## 2024-12-03 DIAGNOSIS — Z86.69 HISTORY OF CATARACT: ICD-10-CM

## 2024-12-03 DIAGNOSIS — E78.5 HYPERLIPIDEMIA, UNSPECIFIED HYPERLIPIDEMIA TYPE: ICD-10-CM

## 2024-12-03 DIAGNOSIS — I10 PRIMARY HYPERTENSION: Primary | ICD-10-CM

## 2024-12-03 DIAGNOSIS — F17.210 CIGARETTE SMOKER: ICD-10-CM

## 2024-12-03 PROCEDURE — 1160F RVW MEDS BY RX/DR IN RCRD: CPT | Performed by: FAMILY MEDICINE

## 2024-12-03 PROCEDURE — 99214 OFFICE O/P EST MOD 30 MIN: CPT | Performed by: FAMILY MEDICINE

## 2024-12-03 PROCEDURE — G2211 COMPLEX E/M VISIT ADD ON: HCPCS | Performed by: FAMILY MEDICINE

## 2024-12-03 PROCEDURE — 1159F MED LIST DOCD IN RCRD: CPT | Performed by: FAMILY MEDICINE

## 2024-12-03 RX ORDER — SIMVASTATIN 40 MG
40 TABLET ORAL NIGHTLY
Qty: 90 TABLET | Refills: 3 | Status: SHIPPED | OUTPATIENT
Start: 2024-12-03

## 2024-12-03 RX ORDER — METOPROLOL SUCCINATE 25 MG/1
25 TABLET, EXTENDED RELEASE ORAL DAILY
Qty: 90 TABLET | Refills: 3 | Status: SHIPPED | OUTPATIENT
Start: 2024-12-03

## 2024-12-03 NOTE — ASSESSMENT & PLAN NOTE
Patient with a resting tremor as well as subtle cogwheel rigidity seen on examination.  Recommend establishing care with a neurologist.

## 2024-12-03 NOTE — ASSESSMENT & PLAN NOTE
Blood pressures improved  Continue lisinopril to 40 mg daily  Continue metoprolol succinate ER 25 mg daily

## 2024-12-03 NOTE — PATIENT INSTRUCTIONS
PATIENT INSTRUCTIONS    Thank you for seeing me today, it was a pleasure taking care of you.  Please check out at the  and schedule a follow up appointment.  Return in about 6 months (around 6/3/2025) for medicare visit.  Please remember that the preferred kailyn period for appointments is 5 minutes. This is to help maximize the amount of time that we can spend together at our visits.    Continue lisinopril 40 mg daily, metoprolol succinate 25 mg daily, simvastatin 40 mg nightly  The following imaging studies were ordered: CT lung, [facility] ultrasound  Please call [phone] to schedule your imaging appointment.   Please also follow up with the following specialists: Ophthalmology   Provider Address Phone   Pankaj Patel MD [address] [address] [phone]      Neurology  Provider Address Phone   Jenny Polanco DO [address] [address] [phone]      Cologuard stool test - should be mailed to home    Dr. Clemente Patel

## 2025-05-18 DIAGNOSIS — I10 PRIMARY HYPERTENSION: ICD-10-CM

## 2025-05-19 RX ORDER — LISINOPRIL 40 MG/1
40 TABLET ORAL DAILY
Qty: 90 TABLET | Refills: 0 | Status: SHIPPED | OUTPATIENT
Start: 2025-05-19

## 2025-06-14 ENCOUNTER — OFFICE VISIT (OUTPATIENT)
Dept: INTERNAL MEDICINE CLINIC | Facility: CLINIC | Age: 75
End: 2025-06-14
Payer: MEDICARE

## 2025-06-14 VITALS
BODY MASS INDEX: 28.23 KG/M2 | HEIGHT: 67.5 IN | DIASTOLIC BLOOD PRESSURE: 84 MMHG | SYSTOLIC BLOOD PRESSURE: 134 MMHG | OXYGEN SATURATION: 97 % | WEIGHT: 182 LBS | HEART RATE: 83 BPM | TEMPERATURE: 98 F

## 2025-06-14 DIAGNOSIS — Z12.11 SCREENING FOR COLON CANCER: ICD-10-CM

## 2025-06-14 DIAGNOSIS — E78.5 HYPERLIPIDEMIA, UNSPECIFIED HYPERLIPIDEMIA TYPE: ICD-10-CM

## 2025-06-14 DIAGNOSIS — L57.0 ACTINIC KERATOSES: ICD-10-CM

## 2025-06-14 DIAGNOSIS — Z00.00 HEALTH MAINTENANCE EXAMINATION: Primary | ICD-10-CM

## 2025-06-14 DIAGNOSIS — R25.1 TREMOR: ICD-10-CM

## 2025-06-14 DIAGNOSIS — Z86.69 HISTORY OF CATARACT: ICD-10-CM

## 2025-06-14 DIAGNOSIS — F17.210 CIGARETTE SMOKER: ICD-10-CM

## 2025-06-14 DIAGNOSIS — E66.3 OVERWEIGHT (BMI 25.0-29.9): ICD-10-CM

## 2025-06-14 DIAGNOSIS — I10 PRIMARY HYPERTENSION: ICD-10-CM

## 2025-06-14 RX ORDER — LISINOPRIL 40 MG/1
40 TABLET ORAL DAILY
Qty: 90 TABLET | Refills: 3 | Status: SHIPPED | OUTPATIENT
Start: 2025-06-14

## 2025-06-14 RX ORDER — SIMVASTATIN 40 MG
40 TABLET ORAL NIGHTLY
Qty: 90 TABLET | Refills: 3 | Status: SHIPPED | OUTPATIENT
Start: 2025-06-14

## 2025-06-14 RX ORDER — METOPROLOL SUCCINATE 25 MG/1
25 TABLET, EXTENDED RELEASE ORAL DAILY
Qty: 90 TABLET | Refills: 3 | Status: SHIPPED | OUTPATIENT
Start: 2025-06-14

## 2025-06-14 NOTE — ASSESSMENT & PLAN NOTE
Exercise and diet advised.  CBC, CMP, lipid panel  Shingles vaccine advised  COVID vaccine advised.  Advanced directive information provided.  FIT test ordered for patient  CT lung screen ordered.  AAA screen ordered.

## 2025-06-14 NOTE — ASSESSMENT & PLAN NOTE
Blood pressures controlled  Continue lisinopril to 40 mg daily  Continue metoprolol succinate ER 25 mg daily

## 2025-06-14 NOTE — PROGRESS NOTES
FAMILY MEDICINE CLINIC NOTE - MEDICARE    HPI  Aram Mcmullen is a 74 year old male presenting for a MA AHA (Medicare Advantage Annual Health Assessment) and Initial Annual Wellness Visit (outside the first 12 months of Medicare eligibility, no prior AWV).    #Health Maintenance  -Diet: Trying to eat healthy   -Exercise: Everyday - one hour on the bike.   -Lung cancer screen: Indicated  -Colon cancer screen: Indicated - will do FIT   -Prostate cancer screen: Indicated  -Aortic aneurysm screen: Indicated  -Statin:  Will check lipid panel  -ASA: Not indicated  -HIV screen: Not indicated  -Hep C screen: - 11/2024 negative  -Gonorrhea/chlamydia:  Not indicated  -Syphillis: Not indicated  -TB: Not indicated  -Tobacco/alcohol: Per below      #Immunizations  -Tdap: Medicare  -Flu shot: Not indicated - not season  -PCV13: 5/2016   -PCV20: 1/2023   -PPSV23: Not indicated   -RZV (preferred) or VZL: Indicated   -RSV: Optional   -COVID: Indicated      #HTN  -lisinopril 40 mg daily  -metoprolol succinate 25 mg daily   -reports blood pressures are very good at home now - in 120s systolic     #Tremor  -bilateral hands  -ongoing for a few years   -not significantly bothersome  -does not want to see a neurologist at this time      #HLD  -simvastatin 40 mg daily    #Cigarette smoker  -still smoking   -CT lung screen ordered  -[facility] ultrasound ordered     #History of cataracts  -has had history of cataract extraction 2012  -referred to new opthalmology     #Additional screenings  History/Other:   Fall Risk Assessment:   He has been screened for Falls and is low risk.      Cognitive Assessment:   He had a completely normal cognitive assessment - see flowsheet entries       Functional Ability/Status:   Aram Mcmullen has a completely normal functional assessment. See flowsheet for details.      Depression Screening (PHQ-2/PHQ-9): PHQ-2 SCORE: 0  , done 6/14/2025        Advanced Directives:   He does NOT have a Living Will. [Do you have a  living will?: No]  He does NOT have a Power of  for Health Care. [Do you have a healthcare power of ?: No]  Discussed Advance Care Planning with patient (and family/surrogate if present). Standard forms made available to patient in After Visit Summary.    #Patient Care Team  Patient Care Team:  Frederick Cornejo MD as PCP - General (Family Medicine)      ROS  GENERAL: No fever/chills, no recent weight loss   HEENT: No visual changes, no changes in hearing, no sore throats  NECK: No pain, no swelling  RESP: No cough, no SOB  CV: No chest pain, no palpitations  GI: No abd pain, no N/V/D  MSK: No edema, no pain  SKIN: No new rashes  NEURO: No numbness, no tingling, no HA    HEALTH MAINTENANCE CHECKLIST  Health Maintenance Topics with due status: Overdue       Topic Date Due    Colorectal Cancer Screening Never done    PSA Never done    Zoster Vaccines Never done    Lung Cancer Screening Never done    COVID-19 Vaccine Never done    Annual Well Visit 01/01/2025    Annual Depression Screening 01/01/2025    Fall Risk Screening (Annual) 01/01/2025    Tobacco Cessation Counseling 01/01/2025       ALLERGIES  Allergies[1]    MEDICATIONS  Current Medications[2]    ACTIVE PROBLEM  Problem List[3]    PAST MEDICAL HISTORY  Past Medical History[4]    PAST SOCIAL HISTORY  Social History     Socioeconomic History    Marital status: Legally      Spouse name: Not on file    Number of children: Not on file    Years of education: Not on file    Highest education level: Not on file   Occupational History    Not on file   Tobacco Use    Smoking status: Every Day     Current packs/day: 0.50     Average packs/day: 0.5 packs/day for 57.4 years (28.7 ttl pk-yrs)     Types: Cigarettes     Start date: 1968    Smokeless tobacco: Never   Vaping Use    Vaping status: Never Used   Substance and Sexual Activity    Alcohol use: Yes     Comment: Occasional - a couple times a week    Drug use: Never    Sexual activity: Not  Currently     Partners: Female   Other Topics Concern    Not on file   Social History Narrative    Relationships:     Children: None    Pets: None    School: N/A    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     Origin: From [place], came to  1986    Interests: Not much    Spiritual: Taoism      Social Drivers of Health     Food Insecurity: Not on file   Transportation Needs: Not on file   Stress: Not on file   Housing Stability: Not on file       CAGE Alcohol Screen:   CAGE screening score of 0 on 6/14/2025, showing low risk of alcohol abuse.          PAST SURGICAL HISTORY  Past Surgical History[5]    PAST FAMILY HISTORY  Family History[6]    PHYSICAL EXAM  Vitals:    06/14/25 0848 06/14/25 0906   BP: 148/90 134/84   Pulse: 83    Temp: 97.6 °F (36.4 °C)    SpO2: 97%    Weight: 182 lb (82.6 kg)    Height: 5' 7.5\" (1.715 m)       Body mass index is 28.08 kg/m².    Medicare Hearing Assessment:  Hearing Screening    Time taken: 6/14/2025 12:02 PM  Entry User: Frederick Cornejo MD  Screening Method: Finger Rub  Finger Rub Result: Pass       Visual Acuity:   Visual Acuity:   Right Eye Visual Acuity: Uncorrected     Left Eye Visual Acuity: Uncorrected     Both Eyes Visual Acuity: Uncorrected Both Eyes Chart Acuity: 20/40   Able To Tolerate Visual Acuity: Yes        GENERAL: NAD  HEENT: Moist mucous membranes, no tonsillar swelling or erythema, PERRLA bilat, TM translucent and non-bulging  NECK: Supple, non-tender  RESP: CTAB, no wheezing, no rales, no rhonchi  CV: RRR, no murmurs  GI: Soft, non-distended, non-tender, no guarding, no rebound, no masses  MSK: No edema  SKIN: Warm and dry, scattered white papules seen on the bilateral forearms  NEURO: Answering questions appropriately.  Very mild tremor with arms outstretched.  Very mild cogwheel rigidity seen with flexion extension of the bilateral elbows    LABS    Lab Results   Component Value Date    WBC 11.1 (H) 11/12/2024    HGB 16.4 11/12/2024     11/12/2024        Lab Results   Component Value Date    AST 24 11/12/2024    ALT 31 11/12/2024    CA 9.6 11/12/2024    ALB 4.2 11/12/2024    CREATSERUM 0.98 11/12/2024     (H) 11/12/2024       Lab Results   Component Value Date    CHOLEST 185 11/12/2024    HDL 36 (L) 11/12/2024     (H) 11/12/2024    TRIG 400 (H) 11/12/2024         DIAGNOSTICS    ASSESSMENT/PLAN  Problem List Items Addressed This Visit          Cardiac and Vasculature    Hyperlipidemia    Now taking simvastatin 40 mg nightly on a consistent basis.  Monitor CMP and lipid panel         Relevant Medications    simvastatin 40 MG Oral Tab    Hypertension    Blood pressures controlled  Continue lisinopril to 40 mg daily  Continue metoprolol succinate ER 25 mg daily         Relevant Medications    lisinopril 40 MG Oral Tab    metoprolol succinate ER 25 MG Oral Tablet 24 Hr       Endocrine and Metabolic    Overweight (BMI 25.0-29.9)    Diet and exercise advised.  Check labs.         Relevant Medications    simvastatin 40 MG Oral Tab       Neuro    Tremor    Patient with a resting tremor as well as subtle cogwheel rigidity seen on examination.  Reports no significant issues with functioning  Defers on seeing a neurologist          Relevant Medications    metoprolol succinate ER 25 MG Oral Tablet 24 Hr       Eye    History of cataract    Referral to ophthalmologist provided         Relevant Orders    OPHTHALMOLOGY - EXTERNAL       Skin    Actinic keratoses    Referral to dermatology provided         Relevant Orders    DERM - INTERNAL       Tobacco    Cigarette smoker    Cigarette smoking cessation advised.  He is not quite ready to quit at this time  CT lung screen  [facility] ultrasound         Relevant Orders    CT LUNG LD SCREENING(CPT=71271)    US ABDOMINAL AORTIC ANEURYSM SCREENING (CPT=76706)    Tobacco Use Counseling 3-10 Min [62819]       Health Encounters    Health maintenance examination - Primary    Exercise and diet advised.  CBC, CMP, lipid  panel  Shingles vaccine advised  COVID vaccine advised.  Advanced directive information provided.  FIT test ordered for patient  CT lung screen ordered.  AAA screen ordered.         Relevant Orders    CT LUNG LD SCREENING(CPT=71271)    US ABDOMINAL AORTIC ANEURYSM SCREENING (CPT=76706)    CBC With Differential With Platelet    Comp Metabolic Panel (14)    Lipid Panel    PSA Total, Diagnostic     Other Visit Diagnoses         Screening for colon cancer        Relevant Orders    Occult Blood, Fecal, FIT Immunoassay ([employer])            Return in about 1 year (around 6/14/2026) for medicare visit.    Frederick Cornejo MD  Family Medicine    6/14/2025         Supplementary Documentation:   General Health:  In the past six months, have you lost more than 10 pounds without trying?: 3 - Don't know  Has your appetite been poor?: No  Type of Diet: Balanced  How does the patient maintain a good energy level?: Appropriate Exercise  How would you describe your daily physical activity?: Moderate  How would you describe your current health state?: Fair  How do you maintain positive mental well-being?: Social Interaction  On a scale of 0 to 10, with 0 being no pain and 10 being severe pain, what is your pain level?: 0 - (None)  In the past six months, have you experienced urine leakage?: 0-No  At any time do you feel concerned for the safety/well-being of yourself and/or your children, in your home or elsewhere?: No  Have you had any immunizations at another office such as Influenza, Hepatitis B, Tetanus, or Pneumococcal?: No        Aram Mcmullen's SCREENING SCHEDULE   Tests on this list are recommended by your physician but may not be covered, or covered at this frequency, by your insurer.   Please check with your insurance carrier before scheduling to verify coverage.   PREVENTATIVE SERVICES FREQUENCY &  COVERAGE DETAILS LAST COMPLETION DATE   Diabetes Screening    Fasting Blood Sugar / Glucose    One screening every 12 months  if never tested or if previously tested but not diagnosed with pre-diabetes   One screening every 6 months if diagnosed with pre-diabetes Lab Results   Component Value Date     (H) 11/12/2024        Cardiovascular Disease Screening    Lipid Panel  Cholesterol  Lipoprotein (HDL)  Triglycerides Covered every 5 years for all Medicare beneficiaries without apparent signs or symptoms of cardiovascular disease Lab Results   Component Value Date    CHOLEST 185 11/12/2024    HDL 36 (L) 11/12/2024     (H) 11/12/2024    TRIG 400 (H) 11/12/2024         Electrocardiogram (EKG)   Covered if needed at Welcome to Medicare, and non-screening if indicated for medical reasons -      Ultrasound Screening for Abdominal Aortic Aneurysm (AAA) Covered once in a lifetime for one of the following risk factors    Men who are 65-75 years old and have ever smoked    Anyone with a family history -     Colorectal Cancer Screening  Covered for ages 50-85; only need ONE of the following:    Colonoscopy   Covered every 10 years    Covered every 2 years if patient is at high risk or previous colonoscopy was abnormal -    Health Maintenance   Topic Date Due    Colorectal Cancer Screening  Never done       Flexible Sigmoidoscopy   Covered every 4 years -    Fecal Occult Blood Test Covered annually -   Prostate Cancer Screening    Prostate-Specific Antigen (PSA) Annually No results found for: \"PSA\"  Health Maintenance   Topic Date Due    PSA  Never done      Immunizations    Influenza Covered once per flu season  Please get every year 08/03/2024  No recommendations at this time    Pneumococcal Each vaccine ([id number] & [id number]) covered once after 65 Prevnar 13: 05/28/2016    [id number]: -     No recommendations at this time    Hepatitis B One screening covered for patients with certain risk factors   -  No recommendations at this time    Tetanus Toxoid Not covered by Medicare Part B unless medically necessary (cut with metal); may be  covered with your pharmacy prescription benefits -    Tetanus, Diptheria and Pertusis TD and TDaP Not covered by Medicare Part B -  No recommendations at this time    Zoster Not covered by Medicare Part B; may be covered with your pharmacy  prescription benefits -  Zoster Vaccines(1 of 2) Never done     Annual Monitoring of Persistent Medications (ACE/ARB, digoxin diuretics, anticonvulsants)    Potassium Annually Lab Results   Component Value Date    K 4.9 11/12/2024         Creatinine   Annually Lab Results   Component Value Date    CREATSERUM 0.98 11/12/2024         BUN Annually Lab Results   Component Value Date    BUN 16 11/12/2024       Drug Serum Conc Annually No results found for: \"DIGOXIN\", \"DIG\", \"VALP\"            Tobacco cessation counseling for <3 minutes.       [1] No Known Allergies  [2]   Current Outpatient Medications   Medication Sig Dispense Refill    lisinopril 40 MG Oral Tab Take 1 tablet (40 mg total) by mouth daily. 90 tablet 3    metoprolol succinate ER 25 MG Oral Tablet 24 Hr Take 1 tablet (25 mg total) by mouth daily. 90 tablet 3    simvastatin 40 MG Oral Tab Take 1 tablet (40 mg total) by mouth nightly. 90 tablet 3   [3]   Patient Active Problem List  Diagnosis    Hypertension    Hyperlipidemia    Cigarette smoker    Health maintenance examination    History of cataract    Overweight (BMI 25.0-29.9)    Tremor    Actinic keratoses   [4]   Past Medical History:   Essential hypertension   [5]   Past Surgical History:  Procedure Laterality Date    Cataract Bilateral 2012   [6]   Family History  Problem Relation Age of Onset    No Known Problems Mother     No Known Problems Father     Stroke Sister     Hypertension Brother     No Known Problems Maternal Grandmother     No Known Problems Maternal Grandfather     No Known Problems Paternal Grandmother     No Known Problems Paternal Grandfather     Colon Cancer Neg     Prostate Cancer Neg

## 2025-06-14 NOTE — ASSESSMENT & PLAN NOTE
Patient with a resting tremor as well as subtle cogwheel rigidity seen on examination.  Reports no significant issues with functioning  Defers on seeing a neurologist

## 2025-06-14 NOTE — PATIENT INSTRUCTIONS
PATIENT INSTRUCTIONS    Thank you for seeing me today, it was a pleasure taking care of you.  Please check out at the  and schedule a follow up appointment.  Return in about 1 year (around 6/14/2026) for medicare visit.  Please remember that the preferred kailyn period for appointments is 5 minutes. This is to help maximize the amount of time that we can spend together at our visits.    Please get your labs drawn at your preferred lab.  The following imaging studies were ordered: CT scan lung, AAA ultrasound  Please call [phone] to schedule your imaging appointment.   Please also follow up with the following specialists: Dermatology  Provider Address Phone   Miguel Angel Leroy  [facility] [address] [phone]      Ophthalmology  Provider Address Phone   Pankaj Patel MD [address] [facility] [address] [phone]       Please fill out the advance directive information (power of  documents) and bring a copy to our clinic.  Continue current medications   Shingles vaccine at pharmacy     [facility],   Dr. Cornejo    WORKING OUT WORKS LABS INFORMATION    Here are some lab locations available to you. Please call and make a lab appointment. Please note that some of the times and availabilities are subject to change. Please refer to the [facility] webpage for the most recent updates.    [place]  [address], [place] [address]  [phone]  Monday-Friday: 7:30 AM-3:30 PM  Saturday: 7:30 AM-12 PM    [place]  [address], [place] [address]  [phone]  Monday-Friday: 7:30 AM-3:30 PM    [place]  [address], [place] [id number] [phone]  Monday-Friday: 8 AM-4 PM  Saturday: 8 AM-1 PM    [place]  [address], [place] [address]  [phone]  Monday-Friday: 7 AM-3 PM  Saturday: 7 AM-12 PM    [place]  [address], [place], [place] [id number] [phone]  Monday-Friday: 7 AM-3 PM  Saturday: 8  AM-1 PM    [place]  [address] [place] [address]  [phone]  Monday-Friday: 6:30 AM-4 PM  Saturday: 6:30 AM-12 PM    [facility]  [address], [place] [address]  [phone]  Monday-Friday: 7 AM-3 PM  Saturday: 7:30 AM-12:30 PM    [place] ([facility])  [address], [place], [place] [id number] [phone]  Monday-Friday: 8 AM-4 PM  Saturday: 7 AM-12 PM      Quitting Smoking    Quitting smoking is the most important step you can take to improve your health. We're glad you have set a goal to improve your health.    Quit Smoking Resources    In addition to medications, use the STAR plan to help you successfully quit.   Stick with your quit date!   Tell friends, family, and coworkers your quit date. Request their understanding and support.  Anticipate and prepare for challenges. Some examples are withdrawal symptoms, being around others who smoke, and drinking alcohol.  Remove all tobacco products and paraphernalia from your environment. Make your home and vehicles smoke-free.    Free resources for additional support:  National tobacco quitline: 7-800-QUIT-NOW (1-264.137.5545).  SmokefreeTXT is a free text program to assist you in quitting. Visit https://www.smokefree.gov/smokefreetxt for more information.  Feel free to call your care manager at [phone]) for additional support.